# Patient Record
Sex: MALE | URBAN - METROPOLITAN AREA
[De-identification: names, ages, dates, MRNs, and addresses within clinical notes are randomized per-mention and may not be internally consistent; named-entity substitution may affect disease eponyms.]

---

## 2023-11-28 ENCOUNTER — APPOINTMENT (RX ONLY)
Dept: URBAN - METROPOLITAN AREA OTHER 10 | Facility: OTHER | Age: 38
Setting detail: DERMATOLOGY
End: 2023-11-28

## 2023-11-28 DIAGNOSIS — B07.8 OTHER VIRAL WARTS: ICD-10-CM

## 2023-11-28 DIAGNOSIS — D22 MELANOCYTIC NEVI: ICD-10-CM

## 2023-11-28 PROBLEM — D22.5 MELANOCYTIC NEVI OF TRUNK: Status: ACTIVE | Noted: 2023-11-28

## 2023-11-28 PROCEDURE — ? PRESCRIPTION

## 2023-11-28 PROCEDURE — ? ADDITIONAL NOTES

## 2023-11-28 PROCEDURE — 99203 OFFICE O/P NEW LOW 30 MIN: CPT

## 2023-11-28 PROCEDURE — ? COUNSELING

## 2023-11-28 PROCEDURE — ? PRESCRIPTION MEDICATION MANAGEMENT

## 2023-11-28 RX ORDER — PHARMACY COMPOUNDING ACCESSORY
EACH MISCELLANEOUS
Qty: 60 | Refills: 1 | Status: ERX | COMMUNITY
Start: 2023-11-28

## 2023-11-28 RX ADMIN — Medication: at 00:00

## 2023-11-28 ASSESSMENT — LOCATION DETAILED DESCRIPTION DERM
LOCATION DETAILED: RIGHT MID-UPPER BACK
LOCATION DETAILED: RIGHT VENTRAL PROXIMAL FOREARM
LOCATION DETAILED: LEFT VENTRAL PROXIMAL FOREARM
LOCATION DETAILED: RIGHT INFERIOR UPPER BACK
LOCATION DETAILED: PERIUMBILICAL SKIN

## 2023-11-28 ASSESSMENT — LOCATION SIMPLE DESCRIPTION DERM
LOCATION SIMPLE: LEFT FOREARM
LOCATION SIMPLE: RIGHT FOREARM
LOCATION SIMPLE: ABDOMEN
LOCATION SIMPLE: RIGHT UPPER BACK

## 2023-11-28 ASSESSMENT — LOCATION ZONE DERM
LOCATION ZONE: TRUNK
LOCATION ZONE: ARM

## 2023-11-28 NOTE — PROCEDURE: PRESCRIPTION MEDICATION MANAGEMENT
Continue Regimen: Pharmacy compounding accessory (Salicylic Acid 40% Fluorouracil 5-FU) Wart thwart pen apply to affected areas nightly as tolerated. sent to [facility] pt received pharmacy information
Render In Strict Bullet Format?: No
Detail Level: Zone

## 2024-01-26 ENCOUNTER — APPOINTMENT (RX ONLY)
Dept: URBAN - METROPOLITAN AREA OTHER 10 | Facility: OTHER | Age: 39
Setting detail: DERMATOLOGY
End: 2024-01-26

## 2024-01-26 DIAGNOSIS — L81.0 POSTINFLAMMATORY HYPERPIGMENTATION: ICD-10-CM

## 2024-01-26 DIAGNOSIS — B07.8 OTHER VIRAL WARTS: ICD-10-CM | Status: IMPROVED

## 2024-01-26 PROCEDURE — ? PRESCRIPTION MEDICATION MANAGEMENT

## 2024-01-26 PROCEDURE — ? COUNSELING

## 2024-01-26 PROCEDURE — ? ADDITIONAL NOTES

## 2024-01-26 PROCEDURE — 99213 OFFICE O/P EST LOW 20 MIN: CPT

## 2024-01-26 PROCEDURE — ? PRESCRIPTION

## 2024-01-26 RX ORDER — HYDROQUINONE 40 MG/G
CREAM TOPICAL BID
Qty: 28.4 | Refills: 3 | Status: ERX | COMMUNITY
Start: 2024-01-26

## 2024-01-26 RX ADMIN — HYDROQUINONE: 40 CREAM TOPICAL at 00:00

## 2024-01-26 ASSESSMENT — LOCATION SIMPLE DESCRIPTION DERM
LOCATION SIMPLE: ABDOMEN
LOCATION SIMPLE: RIGHT UPPER BACK
LOCATION SIMPLE: LEFT FOREARM
LOCATION SIMPLE: RIGHT FOREARM

## 2024-01-26 ASSESSMENT — LOCATION DETAILED DESCRIPTION DERM
LOCATION DETAILED: PERIUMBILICAL SKIN
LOCATION DETAILED: RIGHT INFERIOR UPPER BACK
LOCATION DETAILED: LEFT VENTRAL PROXIMAL FOREARM
LOCATION DETAILED: EPIGASTRIC SKIN
LOCATION DETAILED: RIGHT VENTRAL PROXIMAL FOREARM

## 2024-01-26 ASSESSMENT — LOCATION ZONE DERM
LOCATION ZONE: ARM
LOCATION ZONE: TRUNK

## 2024-01-26 NOTE — PROCEDURE: ADDITIONAL NOTES
Render Risk Assessment In Note?: no
Detail Level: Simple
Additional Notes: Advised pt to used [facility]

## 2024-01-26 NOTE — PROCEDURE: PRESCRIPTION MEDICATION MANAGEMENT
Detail Level: Zone
Initiate Treatment: hydroquinone 4 % topical cream Bid\\n\\nSig: Apply to affected areas of dark spots on the affected areas nightly x 3 months
Render In Strict Bullet Format?: No
Discontinue Regimen: pharmacy compounding accessory Miscellaneous\\nSig: Wart thwart pen apply to affected areas nightly as tolerated
Continue Regimen: rec to use compound W OTC b/c the wart pen circumference is causing more PIH even though it is effective